# Patient Record
Sex: FEMALE | Race: WHITE | NOT HISPANIC OR LATINO | Employment: FULL TIME
[De-identification: names, ages, dates, MRNs, and addresses within clinical notes are randomized per-mention and may not be internally consistent; named-entity substitution may affect disease eponyms.]

---

## 2017-06-10 ENCOUNTER — HEALTH MAINTENANCE LETTER (OUTPATIENT)
Age: 52
End: 2017-06-10

## 2019-09-30 ENCOUNTER — HEALTH MAINTENANCE LETTER (OUTPATIENT)
Age: 54
End: 2019-09-30

## 2021-01-15 ENCOUNTER — HEALTH MAINTENANCE LETTER (OUTPATIENT)
Age: 56
End: 2021-01-15

## 2021-01-23 ENCOUNTER — HEALTH MAINTENANCE LETTER (OUTPATIENT)
Age: 56
End: 2021-01-23

## 2021-10-24 ENCOUNTER — HEALTH MAINTENANCE LETTER (OUTPATIENT)
Age: 56
End: 2021-10-24

## 2022-02-13 ENCOUNTER — HEALTH MAINTENANCE LETTER (OUTPATIENT)
Age: 57
End: 2022-02-13

## 2022-09-27 PROCEDURE — 88305 TISSUE EXAM BY PATHOLOGIST: CPT | Mod: 26 | Performed by: PATHOLOGY

## 2022-09-27 PROCEDURE — 88305 TISSUE EXAM BY PATHOLOGIST: CPT | Mod: TC,ORL | Performed by: PLASTIC SURGERY

## 2022-09-28 ENCOUNTER — LAB REQUISITION (OUTPATIENT)
Dept: LAB | Facility: CLINIC | Age: 57
End: 2022-09-28
Payer: COMMERCIAL

## 2022-09-29 LAB
PATH REPORT.COMMENTS IMP SPEC: NORMAL
PATH REPORT.COMMENTS IMP SPEC: NORMAL
PATH REPORT.FINAL DX SPEC: NORMAL
PATH REPORT.GROSS SPEC: NORMAL
PATH REPORT.MICROSCOPIC SPEC OTHER STN: NORMAL
PATH REPORT.RELEVANT HX SPEC: NORMAL
PHOTO IMAGE: NORMAL

## 2022-10-16 ENCOUNTER — HEALTH MAINTENANCE LETTER (OUTPATIENT)
Age: 57
End: 2022-10-16

## 2024-03-25 ENCOUNTER — OFFICE VISIT (OUTPATIENT)
Dept: ALLERGY | Facility: CLINIC | Age: 59
End: 2024-03-25
Payer: COMMERCIAL

## 2024-03-25 VITALS
DIASTOLIC BLOOD PRESSURE: 83 MMHG | OXYGEN SATURATION: 95 % | HEART RATE: 66 BPM | WEIGHT: 293 LBS | SYSTOLIC BLOOD PRESSURE: 138 MMHG

## 2024-03-25 DIAGNOSIS — T78.1XXA ADVERSE FOOD REACTION, INITIAL ENCOUNTER: Primary | ICD-10-CM

## 2024-03-25 DIAGNOSIS — K20.0 EOSINOPHILIC ESOPHAGITIS: ICD-10-CM

## 2024-03-25 PROCEDURE — 95004 PERQ TESTS W/ALRGNC XTRCS: CPT | Performed by: INTERNAL MEDICINE

## 2024-03-25 PROCEDURE — 99204 OFFICE O/P NEW MOD 45 MIN: CPT | Mod: 25 | Performed by: INTERNAL MEDICINE

## 2024-03-25 NOTE — LETTER
3/25/2024         RE: Merry Low  [address]  [facility] [address]        Dear Colleague,    Thank you for referring your patient, Merry Low, to the [facility] [place]. Please see a copy of my visit note below.    Merry Low was seen in the Allergy Clinic at [facility].    Merry Low is a 59 year old female being seen today for evaluation of eosinophilic esophagitis.    She has had 1 episode of partial food impaction August 2023 when eating rice.  She describes how the rice was present in the esophagus for several days and she was able to get little sips of water past the impaction without difficulty.  Eventually it passed on its own.  A week prior to the rice getting stuck, she had increased throat pain in the morning that she attributed to reflux.  She does not typically have reflux symptoms.  She does not recall any preceding problems with dysphagia or food impaction.    She did see gastroenterology at [facility].  We do not have these records.  She was able to pull up on her phone some of the records but it did not specifically give the pathology reports.  She had 4 biopsies of her esophagus and eosinophils were present.  The patient letter does describe eosinophilic esophagitis and negative H. pylori testing.  Omeprazole was recommended however Merry did not feel that was indicated.  She wanted to get to the root cause of the problem.  She then saw a naturopath and blood testing revealed food allergies to egg, dairy, nuts, fruit, veggies.  She has been trying to avoid all of these foods.  She does avoid gluten as well as corn and soy at this time for the last 17 years.      She has not had any subsequent swallowing problems.  She has had 2 endoscopies with biopsies.  Both did show evidence of eosinophilia.  Merry does not recall any discussion about swallowed steroids.  Only omeprazole was  discussed or food avoidance.        Past Medical History:   Diagnosis Date     Allergy, unspecified not elsewhere classified     seasonal allergies     Chondromalacia of patella 2003    s/p arthroscopic surgery and lateral release Dr. Dill     Displacement of cervical intervertebral disc without myelopathy 2006    C5-C6 noted on MRI     Esophageal reflux      Obesity, unspecified      Plantar fasciitis     rt foot     Presence of intrauterine contraceptive device 10/07    Mirena -followed by Dr. Sonia gautam for 5 yrs     Tobacco use disorder 10/09    quit 10/09 with Breathe laser     Family History   Problem Relation Age of Onset     Heart Disease Father         transplant age 62 for a fib, cardiomyopathy, CHF     Breast Cancer Maternal Aunt         at age 60     Neurologic Disorder Mother         MS     Arthritis Maternal Grandmother      Cerebrovascular Disease Maternal Grandmother      Past Surgical History:   Procedure Laterality Date     C IUD,MIRENA  10/07     [facility] MRI CERVICAL SPINE W/O CONTRAST  8/06    c5-6 disc herniation     SURGICAL HISTORY OF -   10/06    s/p C5-6 ant discectomy and fusion with allograft and plating     ZZC NONSPECIFIC PROCEDURE  2003    ARTHROSCOPIC KNEE SURGERY X 2 lt knee     ZZC NONSPECIFIC PROCEDURE      TONSILLECTOMY       ENVIRONMENTAL HISTORY:   Pets inside the house include 2 cat(s).  Do you smoke cigarettes or other recreational drugs? No There is/are 0 smokers living in the house. The house does not have a damp basement.     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      Review of Systems      Current Outpatient Medications:      NO ACTIVE MEDICATIONS, , Disp: , Rfl:   No Known Allergies      EXAM:   /83   Pulse 66   Wt 144.7 kg (318 lb 14.4 oz)   SpO2 95%     Physical Exam    Constitutional:       General: She is not in acute distress.     Appearance: Normal appearance. She is not ill-appearing.   HENT:      Head:  Normocephalic and atraumatic.      Nose: Nose normal. No congestion or rhinorrhea.      Mouth/Throat:      Mouth: Mucous membranes are moist.      Pharynx: Oropharynx is clear. No posterior oropharyngeal erythema.   Eyes:      General:         Right eye: No discharge.         Left eye: No discharge.   Cardiovascular:      Rate and Rhythm: Normal rate and regular rhythm.      Heart sounds: Normal heart sounds.   Pulmonary:      Effort: Pulmonary effort is normal.      Breath sounds: Normal breath sounds. No wheezing or rhonchi.   Skin:     General: Skin is warm.      Findings: No erythema or rash.   Neurological:      General: No focal deficit present.      Mental Status: She is alert. Mental status is at baseline.   Psychiatric:         Mood and Affect: Mood normal.         Behavior: Behavior normal.      WORKUP: Skin testing was negative to the foods tested.    FOOD ALLERGEN PERCUTANEOUS SKIN TESTING      3/25/2024     8:00 AM   [facility] [employer]    Consent Y   Ordering Physician Dr. junior   Interpreting Physician Dr. Junior   Testing Technician Willa LANGSTON   Location Back   Time start: 08:02   Time End: 08:17   Positive Control: Histatrol*ALK 1 mg/ml 5/12   Peanut 1:20 (W/F in millimeters) 0   Pecan  1:20 (W/F in millimeters) 0   [place] 1:20 (W/F in millimeters) 0   Milk, Cow 1:20 (W/F in millimeters) 0   Egg White 1:20 (W/F in millimeters) 0   Chicken 1:20 (W/F in millimeters) 0   Beef 1:20 (W/F in millimeters) 0   Shrimp 1:20 (W/F in millimeters) 0   Sesame Seed  1:20 (W/F in millimeters) 0   Rice 1:20 (W/F in millimeters) 0   Oat 1:20 (W/F in millimeters) 0         ASSESSMENT/PLAN:  Merry Low is a 59 year old female seen today for eosinophilic esophagitis.  We discussed  eosinophilic esophagitis and a handout was provided from up-to-date.    Today, I explained the pathophysiology of eosinophilic esophagitis. The management of EoE was discussed including proton pump inhibitors (PPI), swallowed steroids  (budesonide or fluticasone), food elimination diets (2-4-6 or 6-4-2), and food allergy testing directed elimination diets. The newest medication on the market, dupilumab, is the only FDA approved medication and all other treatment modalities are used off label. We discussed dupilumab as a once weekly injection.     It was explained to the patient that management of EoE is a chronic and indefinite at this time as we know that cessation of therapy results in the return of eosinophils and the subsequent inflammatory process within the esophagus.      Since she has had only 1 episode of food impaction with no symptoms since that episode, and hesitancy to start omeprazole, would recommend opening up her diet.  The test by the naturopath was likely IgG based which are nonspecific.  Today skin testing was negative.  It is difficult to determine what food may be causing symptoms.  The accuracy of skin testing or blood testing is not great for eosinophilic esophagitis.  We discussed intermittent use of omeprazole if she is having heartburn symptoms which could be beneficial for both reflux as well as eosinophilic esophagitis.  There is a proportion of eosinophilic esophagitis patients that are proton pump inhibitor responsive.    Follow-up to be determined      Thank you for allowing me to participate in the care of Merry Low.      I spent 46 minutes on the date of the encounter doing chart review, history and exam, documentation and further coordination as noted above exclusive of separately reported interpretations    Velasquez Junior MD  Allergy/Immunology  [facility]      Per provider verbal order, placed Peanut/Tree Nut Panel scratch test.  Verbal consent was obtained by MD prior to procedure.  Once panels were placed, patient was monitored for 15 minutes in clinic.  Provider read test after 15 minutes.  Pt tolerated procedure well.  All questions and concerns were addressed at office visit.         Willa LANGSTON      Again, thank you for allowing me to participate in the care of your patient.        Sincerely,        Velasquez Junior MD

## 2024-03-25 NOTE — PROGRESS NOTES
Merry Low was seen in the Allergy Clinic at [facility].    Merry Low is a 59 year old female being seen today for evaluation of eosinophilic esophagitis.    She has had 1 episode of partial food impaction August 2023 when eating rice.  She describes how the rice was present in the esophagus for several days and she was able to get little sips of water past the impaction without difficulty.  Eventually it passed on its own.  A week prior to the rice getting stuck, she had increased throat pain in the morning that she attributed to reflux.  She does not typically have reflux symptoms.  She does not recall any preceding problems with dysphagia or food impaction.    She did see gastroenterology at [facility].  We do not have these records.  She was able to pull up on her phone some of the records but it did not specifically give the pathology reports.  She had 4 biopsies of her esophagus and eosinophils were present.  The patient letter does describe eosinophilic esophagitis and negative H. pylori testing.  Omeprazole was recommended however Merry did not feel that was indicated.  She wanted to get to the root cause of the problem.  She then saw a naturopath and blood testing revealed food allergies to egg, dairy, nuts, fruit, veggies.  She has been trying to avoid all of these foods.  She does avoid gluten as well as corn and soy at this time for the last 17 years.      She has not had any subsequent swallowing problems.  She has had 2 endoscopies with biopsies.  Both did show evidence of eosinophilia.  Merry does not recall any discussion about swallowed steroids.  Only omeprazole was discussed or food avoidance.        Past Medical History:   Diagnosis Date    Allergy, unspecified not elsewhere classified     seasonal allergies    Chondromalacia of patella 2003    s/p arthroscopic surgery and lateral release Dr. Dill    Displacement of cervical  intervertebral disc without myelopathy 2006    C5-C6 noted on MRI    Esophageal reflux     Obesity, unspecified     Plantar fasciitis     rt foot    Presence of intrauterine contraceptive device 10/07    Mirena -followed by Dr. Sonia gautam for 5 yrs    Tobacco use disorder 10/09    quit 10/09 with Breathe laser     Family History   Problem Relation Age of Onset    Heart Disease Father         transplant age 62 for a fib, cardiomyopathy, CHF    Breast Cancer Maternal Aunt         at age 60    Neurologic Disorder Mother         MS    Arthritis Maternal Grandmother     Cerebrovascular Disease Maternal Grandmother      Past Surgical History:   Procedure Laterality Date    C IUD,MIRENA  10/07    HC MRI CERVICAL SPINE W/O CONTRAST  8/06    c5-6 disc herniation    SURGICAL HISTORY OF -   10/06    s/p C5-6 ant discectomy and fusion with allograft and plating    ZZC NONSPECIFIC PROCEDURE  2003    ARTHROSCOPIC KNEE SURGERY X 2 lt knee    ZZC NONSPECIFIC PROCEDURE      TONSILLECTOMY       ENVIRONMENTAL HISTORY:   Pets inside the house include 2 cat(s).  Do you smoke cigarettes or other recreational drugs? No There is/are 0 smokers living in the house. The house does not have a damp basement.     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      Review of Systems      Current Outpatient Medications:     NO ACTIVE MEDICATIONS, , Disp: , Rfl:   No Known Allergies      EXAM:   /83   Pulse 66   Wt 144.7 kg (318 lb 14.4 oz)   SpO2 95%     Physical Exam    Constitutional:       General: She is not in acute distress.     Appearance: Normal appearance. She is not ill-appearing.   HENT:      Head: Normocephalic and atraumatic.      Nose: Nose normal. No congestion or rhinorrhea.      Mouth/Throat:      Mouth: Mucous membranes are moist.      Pharynx: Oropharynx is clear. No posterior oropharyngeal erythema.   Eyes:      General:         Right eye: No discharge.         Left eye: No  discharge.   Cardiovascular:      Rate and Rhythm: Normal rate and regular rhythm.      Heart sounds: Normal heart sounds.   Pulmonary:      Effort: Pulmonary effort is normal.      Breath sounds: Normal breath sounds. No wheezing or rhonchi.   Skin:     General: Skin is warm.      Findings: No erythema or rash.   Neurological:      General: No focal deficit present.      Mental Status: She is alert. Mental status is at baseline.   Psychiatric:         Mood and Affect: Mood normal.         Behavior: Behavior normal.      WORKUP: Skin testing was negative to the foods tested.    FOOD ALLERGEN PERCUTANEOUS SKIN TESTING      3/25/2024     8:00 AM   [facility] [employer]    Consent Y   Ordering Physician Dr. junior   Interpreting Physician Dr. Junior   Testing Technician Willa MA   Location Back   Time start: 08:02   Time End: 08:17   Positive Control: Histatrol*ALK 1 mg/ml 5/12   Peanut 1:20 (W/F in millimeters) 0   Pecan  1:20 (W/F in millimeters) 0   [place] 1:20 (W/F in millimeters) 0   Milk, Cow 1:20 (W/F in millimeters) 0   Egg White 1:20 (W/F in millimeters) 0   Chicken 1:20 (W/F in millimeters) 0   Beef 1:20 (W/F in millimeters) 0   Shrimp 1:20 (W/F in millimeters) 0   Sesame Seed  1:20 (W/F in millimeters) 0   Rice 1:20 (W/F in millimeters) 0   Oat 1:20 (W/F in millimeters) 0         ASSESSMENT/PLAN:  Merry Low is a 59 year old female seen today for eosinophilic esophagitis.  We discussed  eosinophilic esophagitis and a handout was provided from up-to-date.    Today, I explained the pathophysiology of eosinophilic esophagitis. The management of EoE was discussed including proton pump inhibitors (PPI), swallowed steroids (budesonide or fluticasone), food elimination diets (2-4-6 or 6-4-2), and food allergy testing directed elimination diets. The newest medication on the market, dupilumab, is the only FDA approved medication and all other treatment modalities are used off label. We discussed dupilumab as a once  weekly injection.     It was explained to the patient that management of EoE is a chronic and indefinite at this time as we know that cessation of therapy results in the return of eosinophils and the subsequent inflammatory process within the esophagus.      Since she has had only 1 episode of food impaction with no symptoms since that episode, and hesitancy to start omeprazole, would recommend opening up her diet.  The test by the naturopath was likely IgG based which are nonspecific.  Today skin testing was negative.  It is difficult to determine what food may be causing symptoms.  The accuracy of skin testing or blood testing is not great for eosinophilic esophagitis.  We discussed intermittent use of omeprazole if she is having heartburn symptoms which could be beneficial for both reflux as well as eosinophilic esophagitis.  There is a proportion of eosinophilic esophagitis patients that are proton pump inhibitor responsive.    Follow-up to be determined      Thank you for allowing me to participate in the care of Merry Low.      I spent 46 minutes on the date of the encounter doing chart review, history and exam, documentation and further coordination as noted above exclusive of separately reported interpretations    Velasquez Junior MD  Allergy/Immunology  [facility]

## 2024-03-25 NOTE — PATIENT INSTRUCTIONS
Today, I explained the pathophysiology of eosinophilic esophagitis. The management of EoE was discussed including proton pump inhibitors (PPI), swallowed steroids (budesonide or fluticasone), food elimination diets (2-4-6 or 6-4-2), and food allergy testing directed elimination diets. The newest medication on the market, dupilumab, is the only FDA approved medication and all other treatment modalities are used off label. We discussed dupilumab as a once weekly injection option with an efficacy of 60-80%.     It was explained to the patient that management of EoE is a chronic and indefinite at this time as we know that cessation of therapy results in the return of eosinophils and the subsequent inflammatory process within the esophagus.      Our plan discussed today will be to start Omeprazole.      Allergy Staff Appt Hours Shot Hours Location       Physician   Velasquez Junior MD      Support Staff   IVIS Bansal MA Emily J., MA      Mondays Tuesdays Thursdays and Fridays:      [place] 7-5 Wednesdays         Close                Mondays, Tuesdays and Fridays:  7:20 - 3:40              [facility]  [address]  [place] [address]  Allergy appointment  line: [phone]    Pulmonary Function Scheduling:  [place]: [phone]           Questions about cost of your care  For questions about your cost of your visit, procedure, lab or imaging contact:  [employer] Consumer Price Line [phone] or visit:  [email or web address]    Prescription Assistance  If you need assistance with your prescriptions (cost, coverage, etc) please contact: [employer] [facility] Assistance Program [phone]    Important Scheduling Information  All visits for food challenges, medication/drug allergy testing, and drug challenges MUST be scheduled through the allergy clinic nurse. Please contact them via [employer] or by calling the clinic at [phone] and asking to speak with an allergy nurse. They will provide additional information and instructions for the appointment. Discontinue oral antihistamines 7 days prior to the appointment. Discontinue nasal and ocular antihistamines 1 day prior to the appointment.    Appointments for skin testing: Appointment will last approximately 45 minutes.  Please call the appointment line for your clinic to schedule.  Discontinue oral antihistamines 7 days prior to the appointment.  Discontinue nasal and ocular antihistamines 1 days prior to appointment.    Thank you for trusting us with your care. Please feel free to contact us with any questions or concerns you may have.

## 2024-03-25 NOTE — PROGRESS NOTES
Per provider verbal order, placed Peanut/Tree Nut Panel scratch test.  Verbal consent was obtained by MD prior to procedure.  Once panels were placed, patient was monitored for 15 minutes in clinic.  Provider read test after 15 minutes.  Pt tolerated procedure well.  All questions and concerns were addressed at office visit.        Willa LANGSTON

## 2024-03-27 ENCOUNTER — LAB (OUTPATIENT)
Dept: LAB | Facility: CLINIC | Age: 59
End: 2024-03-27
Payer: COMMERCIAL

## 2024-03-27 DIAGNOSIS — K20.0 EOSINOPHILIC ESOPHAGITIS: ICD-10-CM

## 2024-03-27 DIAGNOSIS — T78.1XXA ADVERSE FOOD REACTION, INITIAL ENCOUNTER: ICD-10-CM

## 2024-03-27 PROCEDURE — 36415 COLL VENOUS BLD VENIPUNCTURE: CPT

## 2024-03-27 PROCEDURE — 86003 ALLG SPEC IGE CRUDE XTRC EA: CPT

## 2024-03-29 LAB
BEEF IGE QN: <0.1 KU(A)/L
COW MILK IGE QN: <0.1 KU(A)/L
EGG WHITE IGE QN: <0.1 KU(A)/L
OAT IGE QN: <0.1 KU(A)/L
RICE IGE QN: <0.1 KU(A)/L

## 2024-08-10 ENCOUNTER — HEALTH MAINTENANCE LETTER (OUTPATIENT)
Age: 59
End: 2024-08-10

## 2025-07-26 ENCOUNTER — HEALTH MAINTENANCE LETTER (OUTPATIENT)
Age: 60
End: 2025-07-26

## 2025-08-16 ENCOUNTER — HEALTH MAINTENANCE LETTER (OUTPATIENT)
Age: 60
End: 2025-08-16